# Patient Record
Sex: MALE | URBAN - METROPOLITAN AREA
[De-identification: names, ages, dates, MRNs, and addresses within clinical notes are randomized per-mention and may not be internally consistent; named-entity substitution may affect disease eponyms.]

---

## 2020-12-21 ENCOUNTER — OFFICE VISIT (OUTPATIENT)
Dept: URBAN - METROPOLITAN AREA CLINIC 72 | Facility: CLINIC | Age: 65
End: 2020-12-21
Payer: COMMERCIAL

## 2020-12-21 DIAGNOSIS — H52.4 PRESBYOPIA: ICD-10-CM

## 2020-12-21 DIAGNOSIS — H25.813 COMBINED FORMS OF AGE-RELATED CATARACT, BILATERAL: ICD-10-CM

## 2020-12-21 DIAGNOSIS — E11.9 TYPE 2 DIABETES MELLITUS W/O COMPLICATION: Primary | ICD-10-CM

## 2020-12-21 PROCEDURE — 92004 COMPRE OPH EXAM NEW PT 1/>: CPT | Performed by: OPHTHALMOLOGY

## 2020-12-21 ASSESSMENT — VISUAL ACUITY
OS: 20/25
OD: 20/25

## 2020-12-21 ASSESSMENT — INTRAOCULAR PRESSURE
OD: 16
OS: 15

## 2020-12-21 NOTE — IMPRESSION/PLAN
Impression: Type 2 diabetes mellitus w/o complication: D78.2. Plan: The clinical exam is consistent with Type 2 DM without retinopathy. The patient was advised to maintain tight blood sugar, blood pressure, and lipid control, and to see us again in 1 year for a repeat dilated fundus examination. The patient was also advised to schedule an appointment with a primary care physician for a diabetic evaluation and counseling to avoid the systemic complications of diabetes.